# Patient Record
Sex: MALE | Race: WHITE | NOT HISPANIC OR LATINO | URBAN - METROPOLITAN AREA
[De-identification: names, ages, dates, MRNs, and addresses within clinical notes are randomized per-mention and may not be internally consistent; named-entity substitution may affect disease eponyms.]

---

## 2023-03-17 ENCOUNTER — EMERGENCY (EMERGENCY)
Facility: HOSPITAL | Age: 33
LOS: 1 days | Discharge: HOME | End: 2023-03-17
Attending: EMERGENCY MEDICINE | Admitting: EMERGENCY MEDICINE
Payer: COMMERCIAL

## 2023-03-17 VITALS
TEMPERATURE: 99 F | SYSTOLIC BLOOD PRESSURE: 119 MMHG | DIASTOLIC BLOOD PRESSURE: 80 MMHG | OXYGEN SATURATION: 100 % | HEART RATE: 81 BPM | RESPIRATION RATE: 16 BRPM

## 2023-03-17 PROCEDURE — 99284 EMERGENCY DEPT VISIT MOD MDM: CPT

## 2023-03-17 RX ORDER — IBUPROFEN 200 MG
600 TABLET ORAL ONCE
Refills: 0 | Status: COMPLETED | OUTPATIENT
Start: 2023-03-17 | End: 2023-03-17

## 2023-03-17 RX ADMIN — Medication 600 MILLIGRAM(S): at 16:40

## 2023-03-17 NOTE — ED PROVIDER NOTE - PHYSICAL EXAMINATION
Primary survey:   A:  airway intact, normal voice  B:  breath sounds clear bilaterally, symmetric chest rise, no flail segment  C:  peripheral pulses 2+ and symmetric, brisk capillary refill     Secondary Survey:    Gen:  AAOx3, non-toxic appearing, non-diaphoretic, speaking full sentences   Scalp:  no skull depression, hematoma, or laceration   Face:  no hematoma or laceration, non-tender sinuses and forehead, no mobile segments   Eyes:  no orbital swelling or tenderness bilaterally, no conjunctival injection, no proptosis, EOMI without pain, PERRL, no visual field abnormalities, no raccoon eyes   Nose:  non-swollen, non-tender, nares clear, no septal hematoma, no rhinorrhea   Ears:  no deformity or ecchymoses, no hemotympanum b/l, external auditory canals clear b/l, no ross's sign, no otorrhea   Mouth:  lips and tongue normal appearing, tooth #8 with linear fracture limited to dentin without pulp or root exposure, no loose teeth, uvula midline, mucosa normal appearing without any laceration or visible bleeding   NECK:  no stridor, normal voice, no jvd, neck supple, no skin laceration   CV:  RRR, s1/s2, no murmur, rubs or gallops, peripheral pulses 2+ and symmetric, no JVD  PULM:  symmetric chest rise, lungs ctab, no wheeze, crackles, or rales, chest wall non-tender   ABD:  non-distended, non-tender, no rebound or guarding, no ecchymoses, no flank ecchymoses, no fluid shift   Spine:  no c/t/l spine tenderness or stepoff   EXT:  no deformity, right distal medial quadriceps muscle tenderness, no knee laxity, stable a/p/v/v stress, rom grossly intact, warm to touch  HAND (b/l):  No deformity. Non-tender throughout.  FDP/FDS/Extensors intact in digits 2-5.  APL/FPL/EPB intact in digit 1.  AIN/PIN/U 5/5.  SILT m/r/u.  Two point discrimination intact in all digits.  Radial pulse 2+.  Brisk cap refill in all digits.  PELVIS:  stable to bony compression   NEURO:  AAOx3, GCS 15, motor 5/5 in ue and le b/l, sensation grossly intact in extremities and trunk, no gait deficit   SKIN:  abrasion to philtrum, 1 cm linear laceration to chin without active bleeding, punctate superficial abrasion to right third mcp
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
## 2023-03-17 NOTE — ED PROVIDER NOTE - PATIENT PORTAL LINK FT
You can access the FollowMyHealth Patient Portal offered by [facility] by registering at the following website: [email or web address]. By joining [facility]’s FollowMyHealth portal, you will also be able to view your health information using other applications (apps) compatible with our system.
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
## 2023-03-17 NOTE — ED PROVIDER NOTE - CARE PLAN
1 Principal Discharge DX:	Facial laceration, initial encounter  Secondary Diagnosis:	Tooth fracture  Secondary Diagnosis:	Right knee pain

## 2025-04-22 NOTE — ED ADULT NURSE NOTE - NS ED PATIENT SAFETY CONCERN
[facility]  Sven Baird MD    [address] [place] [address]  [phone]    Fax: [phone]    Patient Name: Hung Turner  Medical Record Number: [id number]  PCP: Jil Barajas FNP  Age: 68 y.o. : 1956  Contact: [phone] (home)     History of Present Illness:     Hung Turner is a 68 y.o.  male here for follow up of atopic dermatitis. Treatment plan includes clobetasol ointment two times weekly which has improved.     The patient has no other concerns today.    Review of Systems:     Unremarkable other than mentioned above.     Physical Exam:     General: Relaxed, oriented, alert    Skin examination of the scalp, face, neck, chest, back, abdomen, upper extremities and lower extremities were normal except for as listed below      Assessment and Plan:     1. Atopic Dermatitis   - eczematous plaques and papules located on the arms with lichenification    Status: improved    Plan:   Topical Steroid: clobetasol ointment as needed. Discussed risk of atrophy r/t topical steroid use.   - If increase use of clobetasol will send in protopic ointment.     Counseling.    Skin care: Patient should bathe using lukewarm water with a mild cleanser and moisturize immediately after. Emollients should be applied at least 2-3 times daily. Avoid scented detergents or fabric softeners. Keep fingernails short. Avoid excessive hand washing.  Expectations: The patient is aware that eczema is chronic in nature and can improve with moisturizers and topical steroids and worsen with stress, scented soaps, detergents, scratching, dry skin, changes in weather and skin infections.    Contact office if: Eczema worsens or fails to improve despite several weeks of treatment; patient develops skin infections (such as: yellow honey colored crusts or cold sores).            Sven Baird MD   Mohs Surgery/Dermatologic Oncology  Dermatology       
No

## 2025-05-13 NOTE — ED ADULT NURSE NOTE - NSFALLRSKASSESSDT_ED_ALL_ED
2-calculated by average/Not able to assess (calculate score using [facility] averaging method) 17-Mar-2023 17:01  Symptoms